# Patient Record
Sex: MALE | Race: WHITE | Employment: OTHER | URBAN - METROPOLITAN AREA
[De-identification: names, ages, dates, MRNs, and addresses within clinical notes are randomized per-mention and may not be internally consistent; named-entity substitution may affect disease eponyms.]

---

## 2020-07-15 ENCOUNTER — HOSPITAL ENCOUNTER (OUTPATIENT)
Dept: ULTRASOUND IMAGING | Age: 55
Discharge: HOME | End: 2020-07-15
Attending: FAMILY MEDICINE
Payer: SELF-PAY

## 2020-07-15 DIAGNOSIS — Z72.0 TOBACCO USE: ICD-10-CM

## 2020-07-15 PROCEDURE — 76706 US ABDL AORTA SCREEN AAA: CPT

## 2021-10-19 PROBLEM — Z72.0 TOBACCO USE: Status: ACTIVE | Noted: 2021-10-19

## 2021-10-19 PROBLEM — R63.4 WEIGHT LOSS: Status: ACTIVE | Noted: 2021-10-19

## 2021-10-19 PROBLEM — F10.24 ALCOHOL DEPENDENCE WITH ALCOHOL-INDUCED MOOD DISORDER (HCC): Status: ACTIVE | Noted: 2021-10-19

## 2021-10-19 PROBLEM — F33.1 MODERATE EPISODE OF RECURRENT MAJOR DEPRESSIVE DISORDER (HCC): Status: ACTIVE | Noted: 2021-10-19

## 2022-03-18 PROBLEM — R63.4 WEIGHT LOSS: Status: ACTIVE | Noted: 2021-10-19

## 2022-03-18 PROBLEM — F33.1 MODERATE EPISODE OF RECURRENT MAJOR DEPRESSIVE DISORDER (HCC): Status: ACTIVE | Noted: 2021-10-19

## 2022-03-18 PROBLEM — F10.24 ALCOHOL DEPENDENCE WITH ALCOHOL-INDUCED MOOD DISORDER (HCC): Status: ACTIVE | Noted: 2021-10-19

## 2022-03-19 PROBLEM — Z72.0 TOBACCO USE: Status: ACTIVE | Noted: 2021-10-19

## 2022-10-10 ENCOUNTER — OFFICE VISIT (OUTPATIENT)
Dept: PRIMARY CARE CLINIC | Facility: CLINIC | Age: 57
End: 2022-10-10
Payer: COMMERCIAL

## 2022-10-10 VITALS
HEIGHT: 76 IN | WEIGHT: 228 LBS | OXYGEN SATURATION: 98 % | SYSTOLIC BLOOD PRESSURE: 127 MMHG | BODY MASS INDEX: 27.76 KG/M2 | TEMPERATURE: 97.3 F | DIASTOLIC BLOOD PRESSURE: 71 MMHG | HEART RATE: 84 BPM

## 2022-10-10 DIAGNOSIS — N52.01 ERECTILE DYSFUNCTION DUE TO ARTERIAL INSUFFICIENCY: ICD-10-CM

## 2022-10-10 DIAGNOSIS — Z12.11 SCREENING FOR COLON CANCER: ICD-10-CM

## 2022-10-10 DIAGNOSIS — R73.01 IMPAIRED FASTING GLUCOSE: ICD-10-CM

## 2022-10-10 DIAGNOSIS — Z00.00 ENCOUNTER FOR WELL ADULT EXAM WITHOUT ABNORMAL FINDINGS: ICD-10-CM

## 2022-10-10 DIAGNOSIS — Z12.5 PROSTATE CANCER SCREENING: ICD-10-CM

## 2022-10-10 DIAGNOSIS — Z00.00 WELL ADULT EXAM: ICD-10-CM

## 2022-10-10 DIAGNOSIS — Z00.00 WELL ADULT EXAM: Primary | ICD-10-CM

## 2022-10-10 DIAGNOSIS — L40.9 PSORIASIS: ICD-10-CM

## 2022-10-10 PROCEDURE — 93000 ELECTROCARDIOGRAM COMPLETE: CPT | Performed by: FAMILY MEDICINE

## 2022-10-10 PROCEDURE — 99396 PREV VISIT EST AGE 40-64: CPT | Performed by: FAMILY MEDICINE

## 2022-10-10 RX ORDER — TADALAFIL 20 MG/1
20 TABLET ORAL DAILY PRN
Qty: 10 TABLET | Refills: 5 | Status: SHIPPED | OUTPATIENT
Start: 2022-10-10

## 2022-10-10 RX ORDER — ACETAMINOPHEN/DIPHENHYDRAMINE 500MG-25MG
0.5 TABLET ORAL NIGHTLY PRN
COMMUNITY

## 2022-10-10 ASSESSMENT — PATIENT HEALTH QUESTIONNAIRE - PHQ9
SUM OF ALL RESPONSES TO PHQ9 QUESTIONS 1 & 2: 0
2. FEELING DOWN, DEPRESSED OR HOPELESS: 0
SUM OF ALL RESPONSES TO PHQ QUESTIONS 1-9: 0
1. LITTLE INTEREST OR PLEASURE IN DOING THINGS: 0
SUM OF ALL RESPONSES TO PHQ QUESTIONS 1-9: 0

## 2022-10-10 NOTE — PROGRESS NOTES
[facility] PRIMARY CARE  Elo Sandra M.D.  [address]  [address] 57  Ph No:  [phone]  Fax:  [phone]:  Chief Complaint   Patient presents with    Annual Exam     Patient is here today for a Physical.    Skin Exam     Patient has a lump on his left buttocks that is the about the size of a pea. He would angelica it looked at. IMPRESSION/PLAN    1. Well adult exam  -     EKG 12 Lead  -     CBC with Auto Differential; Future  -     Comprehensive Metabolic Panel; Future  -     PSA Screening; Future  -     Lipid Panel; Future  -     Hemoglobin A1C; Future  2. Erectile dysfunction due to arterial insufficiency  -     tadalafil (CIALIS) 20 MG tablet; Take 1 tablet by mouth daily as needed for Erectile Dysfunction, Disp-10 tablet, R-5Normal  3. Impaired fasting glucose  -     CBC with Auto Differential; Future  -     Comprehensive Metabolic Panel; Future  -     PSA Screening; Future  -     Lipid Panel; Future  -     Hemoglobin A1C; Future  4. Prostate cancer screening  -     CBC with Auto Differential; Future  -     Comprehensive Metabolic Panel; Future  -     PSA Screening; Future  -     Lipid Panel; Future  -     Hemoglobin A1C; Future  5. Psoriasis  6. Screening for colon cancer  -     [facility]- Colonoscopy  -     CBC with Auto Differential; Future  -     Comprehensive Metabolic Panel; Future  -     PSA Screening; Future  -     Lipid Panel; Future  -     Hemoglobin A1C; Future      ***    We discussed the expected course, resolution and complications of the diagnosis(es) in detail. Medication risks, benefits, costs, interactions, and alternatives were discussed as indicated. I advised pt to contact the office if condition worsens, changes or fails to improve as anticipated. Pt expressed understanding with the diagnosis(es) and plan.        HISTORY OF PRESENT ILLNESS:  ***         REVIEW OF SYSTEMS:  Review of Systems    Review of systems is as stated above, otherwise is negative. PHYSICAL EXAM:  Vital Signs - /71 (Site: Left Upper Arm, Position: Sitting, Cuff Size: Large Adult)   Pulse 84   Temp 97.3 °F (36.3 °C) (Temporal)   Ht 6' 3.5\" (1.918 m)   Wt 228 lb (103.4 kg)   SpO2 98%   BMI 28.12 kg/m²      Physical Exam         Elo Barkley MD            Dictated using voice recognition software.  Proofread, but unrecognized voice recognition errors may exist.

## 2022-10-11 LAB
ALBUMIN SERPL-MCNC: 4.2 G/DL (ref 3.5–5)
ALBUMIN/GLOB SERPL: 1.4 {RATIO} (ref 0.4–1.6)
ALP SERPL-CCNC: 63 U/L (ref 50–136)
ALT SERPL-CCNC: 29 U/L (ref 12–65)
ANION GAP SERPL CALC-SCNC: 7 MMOL/L (ref 2–11)
AST SERPL-CCNC: 22 U/L (ref 15–37)
BASOPHILS # BLD: 0.1 K/UL (ref 0–0.2)
BASOPHILS NFR BLD: 1 % (ref 0–2)
BILIRUB SERPL-MCNC: 0.3 MG/DL (ref 0.2–1.1)
BUN SERPL-MCNC: 23 MG/DL (ref 6–23)
CALCIUM SERPL-MCNC: 9.7 MG/DL (ref 8.3–10.4)
CHLORIDE SERPL-SCNC: 106 MMOL/L (ref 101–110)
CHOLEST SERPL-MCNC: 238 MG/DL
CO2 SERPL-SCNC: 28 MMOL/L (ref 21–32)
CREAT SERPL-MCNC: 0.9 MG/DL (ref 0.8–1.5)
DIFFERENTIAL METHOD BLD: NORMAL
EOSINOPHIL # BLD: 0.2 K/UL (ref 0–0.8)
EOSINOPHIL NFR BLD: 2 % (ref 0.5–7.8)
ERYTHROCYTE [DISTWIDTH] IN BLOOD BY AUTOMATED COUNT: 13.2 % (ref 11.9–14.6)
EST. AVERAGE GLUCOSE BLD GHB EST-MCNC: 114 MG/DL
GLOBULIN SER CALC-MCNC: 3.1 G/DL (ref 2.8–4.5)
GLUCOSE SERPL-MCNC: 91 MG/DL (ref 65–100)
HBA1C MFR BLD: 5.6 % (ref 4.8–5.6)
HCT VFR BLD AUTO: 48.1 % (ref 41.1–50.3)
HDLC SERPL-MCNC: 45 MG/DL (ref 40–60)
HDLC SERPL: 5.3 {RATIO}
HGB BLD-MCNC: 15.4 G/DL (ref 13.6–17.2)
IMM GRANULOCYTES # BLD AUTO: 0 K/UL (ref 0–0.5)
IMM GRANULOCYTES NFR BLD AUTO: 0 % (ref 0–5)
LDLC SERPL CALC-MCNC: 157.4 MG/DL
LYMPHOCYTES # BLD: 2.2 K/UL (ref 0.5–4.6)
LYMPHOCYTES NFR BLD: 21 % (ref 13–44)
MCH RBC QN AUTO: 31.7 PG (ref 26.1–32.9)
MCHC RBC AUTO-ENTMCNC: 32 G/DL (ref 31.4–35)
MCV RBC AUTO: 99 FL (ref 82–102)
MONOCYTES # BLD: 0.8 K/UL (ref 0.1–1.3)
MONOCYTES NFR BLD: 8 % (ref 4–12)
NEUTS SEG # BLD: 7 K/UL (ref 1.7–8.2)
NEUTS SEG NFR BLD: 68 % (ref 43–78)
NRBC # BLD: 0 K/UL (ref 0–0.2)
PLATELET # BLD AUTO: 269 K/UL (ref 150–450)
PMV BLD AUTO: 11.6 FL (ref 9.4–12.3)
POTASSIUM SERPL-SCNC: 4.4 MMOL/L (ref 3.5–5.1)
PROT SERPL-MCNC: 7.3 G/DL (ref 6.3–8.2)
PSA SERPL-MCNC: 0.3 NG/ML
RBC # BLD AUTO: 4.86 M/UL (ref 4.23–5.6)
SODIUM SERPL-SCNC: 141 MMOL/L (ref 133–143)
TRIGL SERPL-MCNC: 178 MG/DL (ref 35–150)
VLDLC SERPL CALC-MCNC: 35.6 MG/DL (ref 6–23)
WBC # BLD AUTO: 10.3 K/UL (ref 4.3–11.1)

## 2022-10-13 PROBLEM — L40.9 PSORIASIS: Status: ACTIVE | Noted: 2022-10-13

## 2022-10-13 PROBLEM — R73.01 IMPAIRED FASTING GLUCOSE: Status: ACTIVE | Noted: 2022-10-13

## 2022-10-19 ENCOUNTER — TELEPHONE (OUTPATIENT)
Dept: PRIMARY CARE CLINIC | Facility: CLINIC | Age: 57
End: 2022-10-19

## 2022-10-19 NOTE — TELEPHONE ENCOUNTER
I called the number and the gentleman who answered said he was not Maria R Lauren, the other number was his work number. They said call back tomorrow.

## 2022-10-21 NOTE — PATIENT INSTRUCTIONS
Learning About Medical Power of   What is a medical power of ? A medical power of , also called a durable power of  for health care, is one type of the legal forms called advance directives. It lets you name the person you want to make treatment decisions for you if you can't speak or decide for yourself. The person you choose is called your health care agent. This person is also called a health care proxy or health care surrogate. A medical power of  may be called something else in your state. How do you choose a health care agent? Choose your health care agent carefully. This person may or may not be a family member. Talk to the person before you make your final decision. Make sure he or she is comfortable with this responsibility. It's a good idea to choose someone who:  Is at least 25years old. Knows you well and understands what makes life meaningful for you. Understands your Buddhism and moral values. Will do what you want, not what he or she wants. Will be able to make difficult choices at a stressful time. Will be able to refuse or stop treatment, if that is what you would want, even if you could die. Will be firm and confident with health professionals if needed. Will ask questions to get needed information. Lives near you or agrees to travel to you if needed. Your family may help you make medical decisions while you can still be part of that process. But it's important to choose one person to be your health care agent in case you aren't able to make decisions for yourself. If you don't fill out the legal form and name a health care agent, the decisions your family can make may be limited. A health care agent may be called something else in your state. Who will make decisions for you if you don't have a health care agent? If you don't have a health care agent or a living will, you may not get the care you want.  Decisions may be made by family members who disagree about your medical care. Or decisions may be made by a medical professional who doesn't know you well. In some cases, a  makes the decisions. When you name a health care agent, it is very clear who has the power to make health decisions for you. How do you name a health care agent? You name your health care agent on a legal form. This form is usually called a medical power of . Ask your hospital, state bar association, or office on aging where to find these forms. You must sign the form to make it legal. Some states require you to get the form notarized. This means that a person called a  watches you sign the form and then he or she signs the form. Some states also require that two or more witnesses sign the form. Be sure to tell your family members and doctors who your health care agent is. Where can you learn more? Go to https://Figmentpepiceweb.Kymeta. org and sign in to your Pinstant Karma account. Enter [id number] in the INcubes box to learn more about \"Learning About [address]. \"     If you do not have an account, please click on the \"Sign Up Now\" link. Current as of: October 6, 2021               Content Version: 13.4  © 8332-5552 Healthwise, Incorporated. Care instructions adapted under license by [facility] ([facility]). If you have questions about a medical condition or this instruction, always ask your healthcare professional. [place] [address] any warranty or liability for your use of this information. Well Visit, Men 48 to 72: Care Instructions  Overview     Well visits can help you stay healthy. Your doctor has checked your overall health and may have suggested ways to take good care of yourself. Your doctor also may have recommended tests. At home, you can help prevent illness with healthy eating, regular exercise, and other steps. Follow-up care is a key part of your treatment and safety.  Be sure to make and go to all appointments, and call your doctor if you are having problems. It's also a good idea to know your test results and keep a list of the medicines you take. How can you care for yourself at home? Get screening tests that you and your doctor decide on. Screening helps find diseases before any symptoms appear. Eat healthy foods. Choose fruits, vegetables, whole grains, protein, and low-fat dairy foods. Limit fat, especially saturated fat. Reduce salt in your diet. Limit alcohol. Have no more than 2 drinks a day or 14 drinks a week. Get at least 30 minutes of exercise on most days of the week. Walking is a good choice. You also may want to do other activities, such as running, swimming, cycling, or playing tennis or team sports. Reach and stay at a healthy weight. This will lower your risk for many problems, such as obesity, diabetes, heart disease, and high blood pressure. Do not smoke. Smoking can make health problems worse. If you need help quitting, talk to your doctor about stop-smoking programs and medicines. These can increase your chances of quitting for good. Care for your mental health. It is easy to get weighed down by worry and stress. Learn strategies to manage stress, like deep breathing and mindfulness, and stay connected with your family and community. If you find you often feel sad or hopeless, talk with your doctor. Treatment can help. Talk to your doctor about whether you have any risk factors for sexually transmitted infections (STIs). You can help prevent STIs if you wait to have sex with a new partner (or partners) until you've each been tested for STIs. It also helps if you use condoms (male or female condoms) and if you limit your sex partners to one person who only has sex with you. Vaccines are available for some STIs. If it's important to you to prevent pregnancy with your partner, talk with your doctor about birth control options that might be best for you.   If you think you may have a problem with alcohol or drug use, talk to your doctor. This includes prescription medicines (such as amphetamines and opioids) and illegal drugs (such as cocaine and methamphetamine). Your doctor can help you figure out what type of treatment is best for you. Protect your skin from too much sun. When you're outdoors from 10 a.m. to 4 p.m., stay in the shade or cover up with clothing and a hat with a wide brim. Wear sunglasses that block UV rays. Even when it's cloudy, put broad-spectrum sunscreen (SPF 30 or higher) on any exposed skin. See a dentist one or two times a year for checkups and to have your teeth cleaned. Wear a seat belt in the car. When should you call for help? Watch closely for changes in your health, and be sure to contact your doctor if you have any problems or symptoms that concern you. Where can you learn more? Go to https://Yub.PerioSeal. org and sign in to your Happyshop account. Enter T873 in the [facility] box to learn more about \"Well Visit, Men 48 to 72: Care Instructions. \"     If you do not have an account, please click on the \"Sign Up Now\" link. Current as of: June 6, 2022               Content Version: 13.4  © 2006-2022 Healthwise, Incorporated. Care instructions adapted under license by [facility] ([facility]). If you have questions about a medical condition or this instruction, always ask your healthcare professional. [place] [address] any warranty or liability for your use of this information.

## 2022-10-21 NOTE — PROGRESS NOTES
Well Adult Note  Name: Swetha Lester Date: 10/21/2022   MRN: [id number] Sex: Male   Age: 65 y.o. Ethnicity: Non- / Non    : 1965 Race: White (non-)      Marina Muñiz is here for well adult exam.  History:  Patient here for well exam.  61-year-old gentleman who is relatively healthy. Is not currently on any medications. He has some concerns of function. Like to try Cialis. He denies any chest pain or shortness of breath. He is not currently on nitroglycerin and has no heart history. He otherwise has no other complaints. Review of Systems    No Known Allergies      Prior to Visit Medications    Medication Sig Taking? Authorizing Provider   diphenhydrAMINE-APAP, sleep, (TYLENOL PM EXTRA STRENGTH)  MG tablet Take 0.5 tablets by mouth nightly as needed for Sleep Yes Historical Provider, MD   tadalafil (CIALIS) 20 MG tablet Take 1 tablet by mouth daily as needed for Erectile Dysfunction Yes Elo Rouse MD   Melatonin 10 MG TABS Take by mouth Yes Ar Automatic Reconciliation       History reviewed. No pertinent past medical history. Past Surgical History:   Procedure Laterality Date    ORTHOPEDIC SURGERY      ACL Repair    [place] ABDOMEN SURGERY PROC UNLISTED      Intestinal blockage         Family History   Problem Relation Age of Onset    Suicide Mother     Alcohol Abuse Maternal Aunt     Alcohol Abuse Maternal Uncle     Parkinson's Disease Paternal Uncle     Alcohol Abuse Maternal Grandfather        Social History     Tobacco Use    Smoking status: Every Day     Packs/day: 1.00     Types: Cigarettes    Smokeless tobacco: Never   Substance Use Topics    Alcohol use:  Yes     Alcohol/week: 84.0 standard drinks    Drug use: Not Currently       Objective   /71 (Site: Left Upper Arm, Position: Sitting, Cuff Size: Large Adult)   Pulse 84   Temp 97.3 °F (36.3 °C) (Temporal)   Ht 6' 3.5\" (1.918 m)   Wt 228 lb (103.4 kg)   SpO2 98%   BMI 28.12 kg/m²   Wt Readings from Last 3 Encounters:   10/10/22 228 lb (103.4 kg)   10/04/21 208 lb (94.3 kg)     There were no vitals filed for this visit. Physical Exam  Constitutional:       Appearance: Normal appearance. HENT:      Head: Normocephalic and atraumatic. Right Ear: Tympanic membrane normal.      Left Ear: Tympanic membrane normal.      Nose: Nose normal.      Mouth/Throat:      Mouth: Mucous membranes are moist.   Eyes:      Extraocular Movements: Extraocular movements intact. Conjunctiva/sclera: Conjunctivae normal.      Pupils: Pupils are equal, round, and reactive to light. Cardiovascular:      Rate and Rhythm: Normal rate and regular rhythm. Pulses: Normal pulses. Heart sounds: Normal heart sounds. Pulmonary:      Effort: Pulmonary effort is normal.      Breath sounds: Normal breath sounds. Abdominal:      General: Abdomen is flat. Bowel sounds are normal.      Palpations: Abdomen is soft. Musculoskeletal:         General: Normal range of motion. Skin:     General: Skin is warm and dry. Neurological:      General: No focal deficit present. Mental Status: He is alert and oriented to person, place, and time. Psychiatric:         Mood and Affect: Mood normal.         Behavior: Behavior normal.         Assessment   Plan   1. Well adult exam  -     EKG 12 Lead  -     CBC with Auto Differential; Future  -     Comprehensive Metabolic Panel; Future  -     PSA Screening; Future  -     Lipid Panel; Future  -     Hemoglobin A1C; Future  2. Erectile dysfunction due to arterial insufficiency  -     tadalafil (CIALIS) 20 MG tablet; Take 1 tablet by mouth daily as needed for Erectile Dysfunction, Disp-10 tablet, R-5Normal  3. Impaired fasting glucose  -     CBC with Auto Differential; Future  -     Comprehensive Metabolic Panel; Future  -     PSA Screening; Future  -     Lipid Panel; Future  -     Hemoglobin A1C; Future  4.  Prostate cancer screening  -     CBC with Auto Differential; Future  -     Comprehensive Metabolic Panel; Future  -     PSA Screening; Future  -     Lipid Panel; Future  -     Hemoglobin A1C; Future  5. Psoriasis  6. Screening for colon cancer  -     [facility]  -     CBC with Auto Differential; Future  -     Comprehensive Metabolic Panel; Future  -     PSA Screening; Future  -     Lipid Panel; Future  -     Hemoglobin A1C; Future     We are checking labs today. We will follow-up depending on lab results. Encourage regular exercise and healthy eating. Fasting glucose in the past.  We will check A1c. We gave him Cialis 20 mg as needed for ED. His EKG shows normal sinus rhythm. No ST changes. No axis deviation. Personalized Preventive Plan   Current Health Maintenance Status    There is no immunization history on file for this patient. Health Maintenance   Topic Date Due    COVID-19 Vaccine (1) Never done    HIV screen  Never done    Hepatitis C screen  Never done    DTaP/Tdap/Td vaccine (1 - Tdap) Never done    Colorectal Cancer Screen  Never done    Low dose CT lung screening  Never done    Pneumococcal 0-64 years Vaccine (1 - PCV) 12/30/2022 (Originally 6/28/1971)    Flu vaccine (1) 10/10/2023 (Originally 8/1/2022)    Shingles vaccine (1 of 2) 10/10/2023 (Originally 6/28/2016)    A1C test (Diabetic or Prediabetic)  10/10/2023    Depression Monitoring  10/10/2023    Lipids  10/10/2027    Hepatitis A vaccine  Aged Out    Hib vaccine  Aged Out    Meningococcal (ACWY) vaccine  Aged Out     Recommendations for [employer] Due: see orders and patient instructions/AVS.    Return in about 1 year (around 10/10/2023) for labs 1 week prior to visit.

## 2023-04-25 ENCOUNTER — TELEMEDICINE (OUTPATIENT)
Dept: PRIMARY CARE CLINIC | Facility: CLINIC | Age: 58
End: 2023-04-25
Payer: COMMERCIAL

## 2023-04-25 DIAGNOSIS — F33.1 MAJOR DEPRESSIVE DISORDER, RECURRENT, MODERATE (HCC): Primary | ICD-10-CM

## 2023-04-25 DIAGNOSIS — R68.82 DIMINISHED LIBIDO: ICD-10-CM

## 2023-04-25 DIAGNOSIS — N52.01 ERECTILE DYSFUNCTION DUE TO ARTERIAL INSUFFICIENCY: ICD-10-CM

## 2023-04-25 DIAGNOSIS — E78.00 HYPERCHOLESTEREMIA: ICD-10-CM

## 2023-04-25 PROCEDURE — 99213 OFFICE O/P EST LOW 20 MIN: CPT | Performed by: FAMILY MEDICINE

## 2023-04-25 RX ORDER — BUPROPION HYDROCHLORIDE 150 MG/1
150 TABLET ORAL EVERY MORNING
Qty: 90 TABLET | Refills: 3 | Status: SHIPPED | OUTPATIENT
Start: 2023-04-25

## 2023-04-25 NOTE — PROGRESS NOTES
Cezar Bhatt is a 65 y.o. male evaluated via telephone on 4/25/2023 for   No chief complaint on file. .      Assessment & Plan     1. Major depressive disorder, recurrent, moderate (HCC)  -     Testosterone Total Only, Male; Future  -     Lipid Panel; Future  -     Comprehensive Metabolic Panel; Future  -     CBC with Auto Differential; Future  2. Erectile dysfunction due to arterial insufficiency  -     Testosterone Total Only, Male; Future  -     Lipid Panel; Future  -     Comprehensive Metabolic Panel; Future  -     CBC with Auto Differential; Future  3. Hypercholesteremia  -     Testosterone Total Only, Male; Future  -     Lipid Panel; Future  -     Comprehensive Metabolic Panel; Future  -     CBC with Auto Differential; Future  4. Diminished libido  -     Testosterone Total Only, Male; Future  -     Lipid Panel; Future  -     Comprehensive Metabolic Panel; Future  -     CBC with Auto Differential; Future    Will come in for labs. Restart wellbutrin 150mg. Continue regular exercise. He is reluctant to take statin medication. Denies cp or sob. We discussed increased risk of cad. Will fu depending on labs. Check testosterone level. No follow-ups on file. Subjective   HPI  For fu. Has been working out. Been feeling poorly. Restarted wellbutrin about 1 months ago. Can tell some improvement. Also with some decreased libido and ed. Tried cialis without improvement. Also tried viagra. Has been lifting weight. Has had elevated cholesterol in the past.  Not on medications. Reluctant to take meds. No family hx of cad. No other co.         Objective   Review of Systems  Patient-Reported Vitals  No data recorded   Physical Exam        Cezar Bhatt, was evaluated through a synchronous (real-time) audio-video encounter. The patient (or guardian if applicable) is aware that this is a billable service, which includes applicable co-pays.  This Virtual Visit was conducted with patient's

## 2023-04-26 ENCOUNTER — TELEPHONE (OUTPATIENT)
Dept: PRIMARY CARE CLINIC | Facility: CLINIC | Age: 58
End: 2023-04-26

## 2023-04-26 NOTE — TELEPHONE ENCOUNTER
Called Pedro Grant to check him out of his appt, collect his copay, and schedule his next visit  Needs lab appt now and we can schedule fu visit depending on the labs  He did not answer so I left a message for him to call back

## 2023-05-03 DIAGNOSIS — N52.01 ERECTILE DYSFUNCTION DUE TO ARTERIAL INSUFFICIENCY: ICD-10-CM

## 2023-05-03 DIAGNOSIS — E78.00 HYPERCHOLESTEREMIA: ICD-10-CM

## 2023-05-03 DIAGNOSIS — R68.82 DIMINISHED LIBIDO: ICD-10-CM

## 2023-05-03 DIAGNOSIS — F33.1 MAJOR DEPRESSIVE DISORDER, RECURRENT, MODERATE (HCC): ICD-10-CM

## 2023-05-03 LAB
ALBUMIN SERPL-MCNC: 4.3 G/DL (ref 3.5–5)
ALBUMIN/GLOB SERPL: 1.3 (ref 0.4–1.6)
ALP SERPL-CCNC: 56 U/L (ref 50–136)
ALT SERPL-CCNC: 26 U/L (ref 12–65)
ANION GAP SERPL CALC-SCNC: 2 MMOL/L (ref 2–11)
AST SERPL-CCNC: 20 U/L (ref 15–37)
BASOPHILS # BLD: 0.1 K/UL (ref 0–0.2)
BASOPHILS NFR BLD: 1 % (ref 0–2)
BILIRUB SERPL-MCNC: 0.4 MG/DL (ref 0.2–1.1)
BUN SERPL-MCNC: 21 MG/DL (ref 6–23)
CALCIUM SERPL-MCNC: 9.8 MG/DL (ref 8.3–10.4)
CHLORIDE SERPL-SCNC: 110 MMOL/L (ref 101–110)
CHOLEST SERPL-MCNC: 262 MG/DL
CO2 SERPL-SCNC: 28 MMOL/L (ref 21–32)
CREAT SERPL-MCNC: 0.7 MG/DL (ref 0.8–1.5)
DIFFERENTIAL METHOD BLD: NORMAL
EOSINOPHIL # BLD: 0.3 K/UL (ref 0–0.8)
EOSINOPHIL NFR BLD: 3 % (ref 0.5–7.8)
ERYTHROCYTE [DISTWIDTH] IN BLOOD BY AUTOMATED COUNT: 13.1 % (ref 11.9–14.6)
GLOBULIN SER CALC-MCNC: 3.2 G/DL (ref 2.8–4.5)
GLUCOSE SERPL-MCNC: 100 MG/DL (ref 65–100)
HCT VFR BLD AUTO: 48.3 % (ref 41.1–50.3)
HDLC SERPL-MCNC: 55 MG/DL (ref 40–60)
HDLC SERPL: 4.8
HGB BLD-MCNC: 16 G/DL (ref 13.6–17.2)
IMM GRANULOCYTES # BLD AUTO: 0 K/UL (ref 0–0.5)
IMM GRANULOCYTES NFR BLD AUTO: 0 % (ref 0–5)
LDLC SERPL CALC-MCNC: 185.8 MG/DL
LYMPHOCYTES # BLD: 1.8 K/UL (ref 0.5–4.6)
LYMPHOCYTES NFR BLD: 20 % (ref 13–44)
MCH RBC QN AUTO: 32.3 PG (ref 26.1–32.9)
MCHC RBC AUTO-ENTMCNC: 33.1 G/DL (ref 31.4–35)
MCV RBC AUTO: 97.4 FL (ref 82–102)
MONOCYTES # BLD: 0.8 K/UL (ref 0.1–1.3)
MONOCYTES NFR BLD: 9 % (ref 4–12)
NEUTS SEG # BLD: 6 K/UL (ref 1.7–8.2)
NEUTS SEG NFR BLD: 67 % (ref 43–78)
NRBC # BLD: 0 K/UL (ref 0–0.2)
PLATELET # BLD AUTO: 258 K/UL (ref 150–450)
PMV BLD AUTO: 12 FL (ref 9.4–12.3)
POTASSIUM SERPL-SCNC: 4.6 MMOL/L (ref 3.5–5.1)
PROT SERPL-MCNC: 7.5 G/DL (ref 6.3–8.2)
RBC # BLD AUTO: 4.96 M/UL (ref 4.23–5.6)
SODIUM SERPL-SCNC: 140 MMOL/L (ref 133–143)
TRIGL SERPL-MCNC: 106 MG/DL (ref 35–150)
VLDLC SERPL CALC-MCNC: 21.2 MG/DL (ref 6–23)
WBC # BLD AUTO: 9 K/UL (ref 4.3–11.1)

## 2023-05-04 LAB — TESTOST SERPL-MCNC: 363 NG/DL (ref 264–916)

## 2023-05-22 DIAGNOSIS — Z91.89 AT HIGH RISK FOR CORONARY ARTERY DISEASE: ICD-10-CM

## 2023-05-22 DIAGNOSIS — E78.00 HYPERCHOLESTEREMIA: ICD-10-CM

## 2023-05-22 DIAGNOSIS — E78.00 HYPERCHOLESTEREMIA: Primary | ICD-10-CM

## 2023-05-22 LAB — CRP SERPL HS-MCNC: 1.1 MG/L (ref 0–3)

## 2023-08-03 ENCOUNTER — COMMUNITY OUTREACH (OUTPATIENT)
Dept: PRIMARY CARE CLINIC | Facility: CLINIC | Age: 58
End: 2023-08-03

## 2023-08-03 NOTE — PROGRESS NOTES
Patient's HM shows they are overdue for Colorectal Screening. Care Everywhere and  files searched. No results to attach to order nor HM updated. Pt had initial appt [facility].

## 2023-10-24 ENCOUNTER — TELEPHONE (OUTPATIENT)
Dept: PRIMARY CARE CLINIC | Facility: CLINIC | Age: 58
End: 2023-10-24

## 2023-10-24 NOTE — TELEPHONE ENCOUNTER
Left patient a message that we can not refer to someone without seeing them first. I suggested he goes to Urgent Care to see if they feel like he needs to be referred and if so give us a call next week and we will see if we can fit him in.

## 2023-10-24 NOTE — TELEPHONE ENCOUNTER
Requesting referrals for dermatologist  - has a rash that seems to be spreading and he has never seen before.      If any questions please call the patient [phone]

## 2023-10-25 ENCOUNTER — TELEPHONE (OUTPATIENT)
Dept: PRIMARY CARE CLINIC | Facility: CLINIC | Age: 58
End: 2023-10-25

## 2023-10-25 NOTE — TELEPHONE ENCOUNTER
Pt was wanting a dermatology referral for contact dermatitis on his leg    If pt doesn't answer, please leave a vm

## 2023-10-27 ENCOUNTER — TELEPHONE (OUTPATIENT)
Dept: PRIMARY CARE CLINIC | Facility: CLINIC | Age: 58
End: 2023-10-27

## 2023-10-27 NOTE — TELEPHONE ENCOUNTER
Requesting a call back about rash -- requesting a appt but nothing available. Thinks it is contact dermatitis , please advise.

## 2023-10-30 NOTE — TELEPHONE ENCOUNTER
Patient needs a lab appointment and office visit. Next available. Thank you  Patient already saw someone at work for his rash.

## 2024-01-31 DIAGNOSIS — Z12.5 PROSTATE CANCER SCREENING: ICD-10-CM

## 2024-01-31 DIAGNOSIS — E78.00 HYPERCHOLESTEREMIA: ICD-10-CM

## 2024-01-31 DIAGNOSIS — R63.4 WEIGHT LOSS: ICD-10-CM

## 2024-01-31 DIAGNOSIS — Z00.00 WELL ADULT EXAM: ICD-10-CM

## 2024-01-31 DIAGNOSIS — R73.01 IMPAIRED FASTING GLUCOSE: ICD-10-CM

## 2024-01-31 DIAGNOSIS — R63.4 ABNORMAL WEIGHT LOSS: ICD-10-CM

## 2024-01-31 LAB
ALBUMIN SERPL-MCNC: 4.1 G/DL (ref 3.5–5)
ALBUMIN/GLOB SERPL: 1.2 (ref 0.4–1.6)
ALP SERPL-CCNC: 54 U/L (ref 50–136)
ALT SERPL-CCNC: 36 U/L (ref 12–65)
ANION GAP SERPL CALC-SCNC: NORMAL MMOL/L (ref 2–11)
AST SERPL-CCNC: 21 U/L (ref 15–37)
BASOPHILS # BLD: 0.1 K/UL (ref 0–0.2)
BASOPHILS NFR BLD: 1 % (ref 0–2)
BILIRUB SERPL-MCNC: 0.6 MG/DL (ref 0.2–1.1)
BUN SERPL-MCNC: 16 MG/DL (ref 6–23)
CALCIUM SERPL-MCNC: 9.7 MG/DL (ref 8.3–10.4)
CHLORIDE SERPL-SCNC: 109 MMOL/L (ref 103–113)
CHOLEST SERPL-MCNC: 196 MG/DL
CO2 SERPL-SCNC: 32 MMOL/L (ref 21–32)
CREAT SERPL-MCNC: 0.9 MG/DL (ref 0.8–1.5)
DIFFERENTIAL METHOD BLD: NORMAL
EOSINOPHIL # BLD: 0.3 K/UL (ref 0–0.8)
EOSINOPHIL NFR BLD: 3 % (ref 0.5–7.8)
ERYTHROCYTE [DISTWIDTH] IN BLOOD BY AUTOMATED COUNT: 12.7 % (ref 11.9–14.6)
EST. AVERAGE GLUCOSE BLD GHB EST-MCNC: 103 MG/DL
GLOBULIN SER CALC-MCNC: 3.3 G/DL (ref 2.8–4.5)
GLUCOSE SERPL-MCNC: 100 MG/DL (ref 65–100)
HBA1C MFR BLD: 5.2 % (ref 4.8–5.6)
HCT VFR BLD AUTO: 45.7 % (ref 41.1–50.3)
HDLC SERPL-MCNC: 40 MG/DL (ref 40–60)
HDLC SERPL: 4.9
HGB BLD-MCNC: 14.5 G/DL (ref 13.6–17.2)
IMM GRANULOCYTES # BLD AUTO: 0 K/UL (ref 0–0.5)
IMM GRANULOCYTES NFR BLD AUTO: 0 % (ref 0–5)
LDLC SERPL CALC-MCNC: 135.2 MG/DL
LYMPHOCYTES # BLD: 1.7 K/UL (ref 0.5–4.6)
LYMPHOCYTES NFR BLD: 21 % (ref 13–44)
MCH RBC QN AUTO: 30 PG (ref 26.1–32.9)
MCHC RBC AUTO-ENTMCNC: 31.7 G/DL (ref 31.4–35)
MCV RBC AUTO: 94.4 FL (ref 82–102)
MONOCYTES # BLD: 0.7 K/UL (ref 0.1–1.3)
MONOCYTES NFR BLD: 8 % (ref 4–12)
NEUTS SEG # BLD: 5.2 K/UL (ref 1.7–8.2)
NEUTS SEG NFR BLD: 67 % (ref 43–78)
NRBC # BLD: 0 K/UL (ref 0–0.2)
PLATELET # BLD AUTO: 238 K/UL (ref 150–450)
PMV BLD AUTO: 12 FL (ref 9.4–12.3)
POTASSIUM SERPL-SCNC: 4.7 MMOL/L (ref 3.5–5.1)
PROT SERPL-MCNC: 7.4 G/DL (ref 6.3–8.2)
PSA SERPL-MCNC: 0.3 NG/ML
RBC # BLD AUTO: 4.84 M/UL (ref 4.23–5.6)
SODIUM SERPL-SCNC: 140 MMOL/L (ref 136–146)
TRIGL SERPL-MCNC: 104 MG/DL (ref 35–150)
VLDLC SERPL CALC-MCNC: 20.8 MG/DL (ref 6–23)
WBC # BLD AUTO: 8 K/UL (ref 4.3–11.1)

## 2024-02-05 ENCOUNTER — OFFICE VISIT (OUTPATIENT)
Dept: PRIMARY CARE CLINIC | Facility: CLINIC | Age: 59
End: 2024-02-05
Payer: COMMERCIAL

## 2024-02-05 VITALS
TEMPERATURE: 97.6 F | HEIGHT: 76 IN | WEIGHT: 211 LBS | HEART RATE: 67 BPM | DIASTOLIC BLOOD PRESSURE: 60 MMHG | OXYGEN SATURATION: 96 % | BODY MASS INDEX: 25.69 KG/M2 | SYSTOLIC BLOOD PRESSURE: 100 MMHG

## 2024-02-05 DIAGNOSIS — F33.1 MAJOR DEPRESSIVE DISORDER, RECURRENT, MODERATE (HCC): ICD-10-CM

## 2024-02-05 DIAGNOSIS — Z72.0 TOBACCO USE: ICD-10-CM

## 2024-02-05 DIAGNOSIS — E78.00 HYPERCHOLESTEREMIA: ICD-10-CM

## 2024-02-05 DIAGNOSIS — L40.9 PSORIASIS: ICD-10-CM

## 2024-02-05 DIAGNOSIS — Z00.00 ENCOUNTER FOR WELL ADULT EXAM WITHOUT ABNORMAL FINDINGS: Primary | ICD-10-CM

## 2024-02-05 PROBLEM — F10.24 ALCOHOL DEPENDENCE WITH ALCOHOL-INDUCED MOOD DISORDER (HCC): Status: RESOLVED | Noted: 2021-10-19 | Resolved: 2024-02-05

## 2024-02-05 PROCEDURE — 99396 PREV VISIT EST AGE 40-64: CPT | Performed by: FAMILY MEDICINE

## 2024-02-05 SDOH — ECONOMIC STABILITY: HOUSING INSECURITY
IN THE LAST 12 MONTHS, WAS THERE A TIME WHEN YOU DID NOT HAVE A STEADY PLACE TO SLEEP OR SLEPT IN A SHELTER (INCLUDING NOW)?: NO

## 2024-02-05 SDOH — ECONOMIC STABILITY: INCOME INSECURITY: HOW HARD IS IT FOR YOU TO PAY FOR THE VERY BASICS LIKE FOOD, HOUSING, MEDICAL CARE, AND HEATING?: NOT VERY HARD

## 2024-02-05 SDOH — ECONOMIC STABILITY: FOOD INSECURITY: WITHIN THE PAST 12 MONTHS, THE FOOD YOU BOUGHT JUST DIDN'T LAST AND YOU DIDN'T HAVE MONEY TO GET MORE.: NEVER TRUE

## 2024-02-05 SDOH — ECONOMIC STABILITY: FOOD INSECURITY: WITHIN THE PAST 12 MONTHS, YOU WORRIED THAT YOUR FOOD WOULD RUN OUT BEFORE YOU GOT MONEY TO BUY MORE.: NEVER TRUE

## 2024-02-05 ASSESSMENT — PATIENT HEALTH QUESTIONNAIRE - PHQ9
5. POOR APPETITE OR OVEREATING: 0
1. LITTLE INTEREST OR PLEASURE IN DOING THINGS: 0
2. FEELING DOWN, DEPRESSED OR HOPELESS: 1
9. THOUGHTS THAT YOU WOULD BE BETTER OFF DEAD, OR OF HURTING YOURSELF: 0
3. TROUBLE FALLING OR STAYING ASLEEP: 3
10. IF YOU CHECKED OFF ANY PROBLEMS, HOW DIFFICULT HAVE THESE PROBLEMS MADE IT FOR YOU TO DO YOUR WORK, TAKE CARE OF THINGS AT HOME, OR GET ALONG WITH OTHER PEOPLE: 0
8. MOVING OR SPEAKING SO SLOWLY THAT OTHER PEOPLE COULD HAVE NOTICED. OR THE OPPOSITE, BEING SO FIGETY OR RESTLESS THAT YOU HAVE BEEN MOVING AROUND A LOT MORE THAN USUAL: 0
SUM OF ALL RESPONSES TO PHQ QUESTIONS 1-9: 7
6. FEELING BAD ABOUT YOURSELF - OR THAT YOU ARE A FAILURE OR HAVE LET YOURSELF OR YOUR FAMILY DOWN: 0
SUM OF ALL RESPONSES TO PHQ QUESTIONS 1-9: 7
4. FEELING TIRED OR HAVING LITTLE ENERGY: 3
7. TROUBLE CONCENTRATING ON THINGS, SUCH AS READING THE NEWSPAPER OR WATCHING TELEVISION: 0
SUM OF ALL RESPONSES TO PHQ QUESTIONS 1-9: 7
SUM OF ALL RESPONSES TO PHQ QUESTIONS 1-9: 7
SUM OF ALL RESPONSES TO PHQ9 QUESTIONS 1 & 2: 1

## 2024-02-05 NOTE — PROGRESS NOTES
[facility] PRIMARY CARE  Elo Duran M.D.  [address] [place] [address] [place] [address]  Ph No:  [phone]  Fax:  [phone]  '  CHIEF COMPLAINT:  Chief Complaint   Patient presents with    Annual Exam     Patient is here for a physical.    Skin Exam     Patient has a couple dark spots he would like looked at. He also has Psoriasis.      Discuss Labs          IMPRESSION/PLAN    1. Encounter for well adult exam without abnormal findings  2. Hypercholesteremia  3. Major depressive disorder, recurrent, moderate (HCC)  4. Psoriasis  5. Tobacco use  -     CT LUNG SCREENING; Future    Labs are reviewed and are all normal.  He has restarted Wellbutrin.  Advised that this should help with depression and should also help in his quest quit smoking.    Continue with regular exercise and balanced diet.  He will call if he decides to seek treatment for his psoriasis.  Arrange for low-dose CT of lung to screen for lung cancer.    We discussed the expected course, resolution and complications of the diagnosis(es) in detail.  Medication risks, benefits, costs, interactions, and alternatives were discussed as indicated.  I advised pt to contact the office if condition worsens, changes or fails to improve as anticipated. Pt expressed understanding with the diagnosis(es) and plan.       HISTORY OF PRESENT ILLNESS:  For well exam.  Went to in [facility]oh rehab.  Has not drankin over 6 months.  Wants to quit smoking.  Restated wellbutrin today.  Has ongoing depression.  Works out daily at gym.  Swims about 1 mile.  Denies cp or sob.  Has extensive psoriasis.  Trying to avoid medications.  Does not want to try newer medications for psoriasis bc they lower your immune system.  He has some chronic insomnia.  Takes melatonin and hydroxyzine that was given while in rehab.  Feels it caused some dizziness.  He had labs and is here to review.          REVIEW OF SYSTEMS:  Review of Systems    Review of systems is as stated above,

## 2024-02-19 ENCOUNTER — HOSPITAL ENCOUNTER (OUTPATIENT)
Dept: CT IMAGING | Age: 59
Discharge: HOME | End: 2024-02-22
Attending: FAMILY MEDICINE
Payer: COMMERCIAL

## 2024-02-19 DIAGNOSIS — Z72.0 TOBACCO USE: ICD-10-CM

## 2024-02-19 PROCEDURE — 71271 CT THORAX LUNG CANCER SCR C-: CPT

## 2024-02-20 NOTE — RESULT ENCOUNTER NOTE
Please let the patient know:    The recent CT Lung Screening showed no lung nodules. It does recommend a CT to evaluate enlarged lymph nodes around the axillary area asap.    If any special recommendations were made during the most recent visit, I recommend following up with provider. This is a generalized interpretation of your lab results.

## 2024-02-22 DIAGNOSIS — R59.0 AXILLARY ADENOPATHY: Primary | ICD-10-CM

## 2024-02-28 ENCOUNTER — HOSPITAL ENCOUNTER (OUTPATIENT)
Dept: CT IMAGING | Age: 59
Discharge: HOME | End: 2024-03-02
Payer: COMMERCIAL

## 2024-02-28 DIAGNOSIS — R59.0 AXILLARY ADENOPATHY: ICD-10-CM

## 2024-02-28 PROCEDURE — 6360000004 HC RX CONTRAST MEDICATION: Performed by: NURSE PRACTITIONER

## 2024-02-28 PROCEDURE — 71260 CT THORAX DX C+: CPT

## 2024-02-28 PROCEDURE — 71260 CT THORAX DX C+: CPT | Performed by: RADIOLOGY

## 2024-02-28 RX ADMIN — IOPAMIDOL 100 ML: 755 INJECTION, SOLUTION INTRAVENOUS at 07:53

## 2024-03-05 ENCOUNTER — TELEPHONE (OUTPATIENT)
Dept: PRIMARY CARE CLINIC | Facility: CLINIC | Age: 59
End: 2024-03-05

## 2024-03-05 NOTE — TELEPHONE ENCOUNTER
Requesting a call back about CT- got a letter stating it wasn't covered because notes werent sent in.

## 2024-03-13 NOTE — TELEPHONE ENCOUNTER
Hope, have you receieved any paperwork on this? If not, I can call the radiology department to see about starting a PA. Let me know!!

## 2024-03-15 NOTE — TELEPHONE ENCOUNTER
Spoke w/ radiology dpt. Was told that provider is to call [phone]. Tracking #- [id number] and ask for reconsideration. Peer to peer

## 2024-03-18 DIAGNOSIS — R59.9 SWELLING OF LYMPH NODES: Primary | ICD-10-CM

## 2024-05-03 RX ORDER — BUPROPION HYDROCHLORIDE 150 MG/1
150 TABLET ORAL EVERY MORNING
Qty: 90 TABLET | Refills: 3 | OUTPATIENT
Start: 2024-05-03

## 2024-05-08 ENCOUNTER — TELEPHONE (OUTPATIENT)
Dept: PRIMARY CARE CLINIC | Facility: CLINIC | Age: 59
End: 2024-05-08

## 2024-05-08 NOTE — TELEPHONE ENCOUNTER
----- Message from Cheryl Lawton sent at 5/3/2024 10:49 AM EDT -----  Subject: Refill Request    QUESTIONS  Name of Medication? Other - Hydroxyzine 50 mg   Patient-reported dosage and instructions? 50 mg as needed for sleep   How many days do you have left? 14  Preferred Pharmacy? [id number]  Pharmacy phone number (if available)? [phone]  Additional Information for Provider? pt was in a recovery center and is   about to run out of this medication he was prescribed while he was there.   He will make an appointment for a refill if needed.   ---------------------------------------------------------------------------  --------------  CALL BACK INFO  What is the best way for the office to contact you? OK to leave message on   voicemail  Preferred Call Back Phone Number? [phone]  ---------------------------------------------------------------------------  --------------  SCRIPT ANSWERS  Relationship to Patient? Self

## 2024-05-13 ENCOUNTER — TELEPHONE (OUTPATIENT)
Dept: PRIMARY CARE CLINIC | Facility: CLINIC | Age: 59
End: 2024-05-13

## 2024-05-13 NOTE — TELEPHONE ENCOUNTER
----- Message from Cheryl Lawton sent at 5/3/2024 10:49 AM EDT -----  Subject: Refill Request    QUESTIONS  Name of Medication? Other - Hydroxyzine 50 mg   Patient-reported dosage and instructions? 50 mg as needed for sleep   How many days do you have left? 14  Preferred Pharmacy? [id number]  Pharmacy phone number (if available)? [phone]  Additional Information for Provider? pt was in a recovery center and is   about to run out of this medication he was prescribed while he was there.   He will make an appointment for a refill if needed.   ---------------------------------------------------------------------------  --------------  CALL BACK INFO  What is the best way for the office to contact you? OK to leave message on   voicemail  Preferred Call Back Phone Number? [phone]  ---------------------------------------------------------------------------  --------------  SCRIPT ANSWERS  Relationship to Patient? Self

## 2024-05-17 ENCOUNTER — TELEPHONE (OUTPATIENT)
Dept: PRIMARY CARE CLINIC | Facility: CLINIC | Age: 59
End: 2024-05-17

## 2024-05-20 RX ORDER — HYDROXYZINE 50 MG/1
50 TABLET, FILM COATED ORAL NIGHTLY
Qty: 30 TABLET | Refills: 2 | Status: SHIPPED | OUTPATIENT
Start: 2024-05-20 | End: 2024-08-18

## 2024-06-17 RX ORDER — HYDROXYZINE 50 MG/1
50 TABLET, FILM COATED ORAL NIGHTLY
Qty: 90 TABLET | Refills: 1 | Status: SHIPPED | OUTPATIENT
Start: 2024-06-17

## 2024-12-11 ENCOUNTER — OFFICE VISIT (OUTPATIENT)
Dept: PRIMARY CARE CLINIC | Facility: CLINIC | Age: 59
End: 2024-12-11
Payer: COMMERCIAL

## 2024-12-11 VITALS
OXYGEN SATURATION: 96 % | BODY MASS INDEX: 25.57 KG/M2 | HEIGHT: 76 IN | SYSTOLIC BLOOD PRESSURE: 111 MMHG | HEART RATE: 58 BPM | DIASTOLIC BLOOD PRESSURE: 65 MMHG | WEIGHT: 210 LBS | TEMPERATURE: 97.9 F

## 2024-12-11 DIAGNOSIS — Z12.5 PROSTATE CANCER SCREENING: ICD-10-CM

## 2024-12-11 DIAGNOSIS — E78.00 HYPERCHOLESTEREMIA: ICD-10-CM

## 2024-12-11 DIAGNOSIS — F33.1 MAJOR DEPRESSIVE DISORDER, RECURRENT, MODERATE (HCC): Primary | ICD-10-CM

## 2024-12-11 DIAGNOSIS — F33.1 MAJOR DEPRESSIVE DISORDER, RECURRENT, MODERATE (HCC): ICD-10-CM

## 2024-12-11 DIAGNOSIS — Z72.0 TOBACCO USE: ICD-10-CM

## 2024-12-11 LAB
ALBUMIN SERPL-MCNC: 4.1 G/DL (ref 3.5–5)
ALBUMIN/GLOB SERPL: 1.4 (ref 1–1.9)
ALP SERPL-CCNC: 59 U/L (ref 40–129)
ALT SERPL-CCNC: 23 U/L (ref 8–55)
ANION GAP SERPL CALC-SCNC: 10 MMOL/L (ref 7–16)
AST SERPL-CCNC: 30 U/L (ref 15–37)
BASOPHILS # BLD: 0.1 K/UL (ref 0–0.2)
BASOPHILS NFR BLD: 1 % (ref 0–2)
BILIRUB SERPL-MCNC: 0.5 MG/DL (ref 0–1.2)
BUN SERPL-MCNC: 13 MG/DL (ref 6–23)
CALCIUM SERPL-MCNC: 9.8 MG/DL (ref 8.8–10.2)
CHLORIDE SERPL-SCNC: 104 MMOL/L (ref 98–107)
CHOLEST SERPL-MCNC: 191 MG/DL (ref 0–200)
CO2 SERPL-SCNC: 26 MMOL/L (ref 20–29)
CREAT SERPL-MCNC: 0.86 MG/DL (ref 0.8–1.3)
DIFFERENTIAL METHOD BLD: ABNORMAL
EOSINOPHIL # BLD: 0.3 K/UL (ref 0–0.8)
EOSINOPHIL NFR BLD: 2 % (ref 0.5–7.8)
ERYTHROCYTE [DISTWIDTH] IN BLOOD BY AUTOMATED COUNT: 13.7 % (ref 11.9–14.6)
GLOBULIN SER CALC-MCNC: 3 G/DL (ref 2.3–3.5)
GLUCOSE SERPL-MCNC: 88 MG/DL (ref 70–99)
HCT VFR BLD AUTO: 44.2 % (ref 41.1–50.3)
HDLC SERPL-MCNC: 37 MG/DL (ref 40–60)
HDLC SERPL: 5.2 (ref 0–5)
HGB BLD-MCNC: 14.1 G/DL (ref 13.6–17.2)
IMM GRANULOCYTES # BLD AUTO: 0.1 K/UL (ref 0–0.5)
IMM GRANULOCYTES NFR BLD AUTO: 1 % (ref 0–5)
LDLC SERPL CALC-MCNC: 126 MG/DL (ref 0–100)
LYMPHOCYTES # BLD: 1.8 K/UL (ref 0.5–4.6)
LYMPHOCYTES NFR BLD: 14 % (ref 13–44)
MCH RBC QN AUTO: 29.9 PG (ref 26.1–32.9)
MCHC RBC AUTO-ENTMCNC: 31.9 G/DL (ref 31.4–35)
MCV RBC AUTO: 93.8 FL (ref 82–102)
MONOCYTES # BLD: 0.8 K/UL (ref 0.1–1.3)
MONOCYTES NFR BLD: 6 % (ref 4–12)
NEUTS SEG # BLD: 9.8 K/UL (ref 1.7–8.2)
NEUTS SEG NFR BLD: 76 % (ref 43–78)
NRBC # BLD: 0 K/UL (ref 0–0.2)
PLATELET # BLD AUTO: 257 K/UL (ref 150–450)
PMV BLD AUTO: 11.5 FL (ref 9.4–12.3)
POTASSIUM SERPL-SCNC: 4.4 MMOL/L (ref 3.5–5.1)
PROT SERPL-MCNC: 7.1 G/DL (ref 6.3–8.2)
PSA SERPL-MCNC: 0.3 NG/ML (ref 0–4)
RBC # BLD AUTO: 4.71 M/UL (ref 4.23–5.6)
SODIUM SERPL-SCNC: 140 MMOL/L (ref 136–145)
TRIGL SERPL-MCNC: 140 MG/DL (ref 0–150)
VLDLC SERPL CALC-MCNC: 28 MG/DL (ref 6–23)
WBC # BLD AUTO: 12.8 K/UL (ref 4.3–11.1)

## 2024-12-11 PROCEDURE — 99214 OFFICE O/P EST MOD 30 MIN: CPT | Performed by: FAMILY MEDICINE

## 2024-12-11 RX ORDER — BUPROPION HYDROCHLORIDE 150 MG/1
150 TABLET ORAL EVERY MORNING
Qty: 90 TABLET | Refills: 3 | Status: SHIPPED | OUTPATIENT
Start: 2024-12-11

## 2024-12-11 RX ORDER — HYDROXYZINE HYDROCHLORIDE 50 MG/1
50 TABLET, FILM COATED ORAL NIGHTLY
Qty: 90 TABLET | Refills: 3 | Status: SHIPPED | OUTPATIENT
Start: 2024-12-11

## 2024-12-11 ASSESSMENT — PATIENT HEALTH QUESTIONNAIRE - PHQ9
SUM OF ALL RESPONSES TO PHQ QUESTIONS 1-9: 0
2. FEELING DOWN, DEPRESSED OR HOPELESS: NOT AT ALL
SUM OF ALL RESPONSES TO PHQ QUESTIONS 1-9: 0
SUM OF ALL RESPONSES TO PHQ9 QUESTIONS 1 & 2: 0
SUM OF ALL RESPONSES TO PHQ QUESTIONS 1-9: 0
SUM OF ALL RESPONSES TO PHQ QUESTIONS 1-9: 0
1. LITTLE INTEREST OR PLEASURE IN DOING THINGS: NOT AT ALL

## 2024-12-11 NOTE — PROGRESS NOTES
Immature Granulocytes % 01/31/2024 0  0.0 - 5.0 % Final    Neutrophils Absolute 01/31/2024 5.2  1.7 - 8.2 K/UL Final    Lymphocytes Absolute 01/31/2024 1.7  0.5 - 4.6 K/UL Final    Monocytes Absolute 01/31/2024 0.7  0.1 - 1.3 K/UL Final    Eosinophils Absolute 01/31/2024 0.3  0.0 - 0.8 K/UL Final    Basophils Absolute 01/31/2024 0.1  0.0 - 0.2 K/UL Final    Immature Granulocytes Absolute 01/31/2024 0.0  0.0 - 0.5 K/UL Final       Educational documents were provided including; but, not limited to diagnosis, prognosis, recurrent, complications, monitoring, instructions, prevention, etc.    Patient aware of all medications (prescribed and recommended). Patient verbalized understanding. Patient declined all other medications (OTC, provided by clinic and prescribed) as well as additional testing/imaging/diagnostics at this time. Patient aware of risks associated with declining treatment/recommendations and/or non-compliance with plan of care.    *Side effects, adverse effects, risks versus benefits associated with medications prescribed/recommended were discussed with the patient. Patient verbalized understanding. All questions answered.    *Patient was encouraged to return to the clinic and/or PCP. Or seek emergent care if worsening signs and symptoms warrant immediate evaluation including, but not limited to HA, blurred vision, facial asymmetry, speech disturbance, difficulty with ambulation/gait, numbness, tingling, weakness, syncope, chest pain (with or without radiation), left arm pain, jaw pain, changes in hearing (loss), fever, unexplained sweating, malaise/fatigue, difficulty swallowing, mental changes (confusion, AMS), lightheadedness/dizziness, difficulty breathing, or shortness of breath.    I have reviewed the patient's medication list, past medical, family, social, and surgical history in detail and updated the patient record appropriately.    I have reviewed the patient's vital signs and discussed risks

## 2025-01-07 ENCOUNTER — TELEPHONE (OUTPATIENT)
Dept: PRIMARY CARE CLINIC | Facility: CLINIC | Age: 60
End: 2025-01-07